# Patient Record
Sex: FEMALE | Race: ASIAN | NOT HISPANIC OR LATINO | URBAN - METROPOLITAN AREA
[De-identification: names, ages, dates, MRNs, and addresses within clinical notes are randomized per-mention and may not be internally consistent; named-entity substitution may affect disease eponyms.]

---

## 2022-07-20 ENCOUNTER — EMERGENCY (EMERGENCY)
Facility: HOSPITAL | Age: 57
LOS: 1 days | Discharge: HOME | End: 2022-07-20
Attending: EMERGENCY MEDICINE | Admitting: EMERGENCY MEDICINE

## 2022-07-20 VITALS
HEART RATE: 73 BPM | RESPIRATION RATE: 16 BRPM | TEMPERATURE: 98 F | SYSTOLIC BLOOD PRESSURE: 101 MMHG | DIASTOLIC BLOOD PRESSURE: 63 MMHG | OXYGEN SATURATION: 99 %

## 2022-07-20 VITALS
HEART RATE: 75 BPM | DIASTOLIC BLOOD PRESSURE: 67 MMHG | RESPIRATION RATE: 17 BRPM | SYSTOLIC BLOOD PRESSURE: 101 MMHG | TEMPERATURE: 98 F | OXYGEN SATURATION: 99 %

## 2022-07-20 PROCEDURE — 99283 EMERGENCY DEPT VISIT LOW MDM: CPT

## 2022-07-20 RX ORDER — IBUPROFEN 200 MG
1 TABLET ORAL
Qty: 21 | Refills: 0
Start: 2022-07-20 | End: 2022-07-26

## 2022-07-20 NOTE — ED PROVIDER NOTE - PROGRESS NOTE DETAILS
Patient no longer wants to wait for CT scan after finding out that it will most likely be a couple of hours before results. Patient would like to leave. DO Abi (PGY-2)

## 2022-07-20 NOTE — ED PROVIDER NOTE - NSFOLLOWUPINSTRUCTIONS_ED_ALL_ED_FT
Please follow up with your primary care physician within the next 1-3 days.    Please return to the emergency department if you experience any of the following symptoms:    Fever  Chest pain  Difficulty breathing  Abdominal pain  Nausea  Vomiting   Weakness in arms or legs  Dizziness    Fall Prevention    WHAT YOU NEED TO KNOW:    Fall prevention includes ways to make your home and other areas safer. It also includes ways you can move more carefully to prevent a fall. Health conditions that cause changes in your blood pressure, vision, or muscle strength and coordination may increase your risk for falls. Medicines may also increase your risk for falls if they make you dizzy, weak, or sleepy.    DISCHARGE INSTRUCTIONS:    Call 911 or have someone else call if:  You have fallen and are unconscious.  You have fallen and cannot move part of your body.  Contact your healthcare provider if:  You have fallen and have pain or a headache.  You have questions or concerns about your condition or care.  Fall prevention tips:  Stand or sit up slowly. This may help you keep your balance and prevent falls.  Use assistive devices as directed. Your healthcare provider may suggest that you use a cane or walker to help you keep your balance. You may need to have grab bars put in your bathroom near the toilet or in the shower.  Wear shoes that fit well and have soles that . Wear shoes both inside and outside. Use slippers with good . Do not wear shoes with high heels.  Wear a personal alarm. This is a device that allows you to call 911 if you fall and need help. Ask your healthcare provider for more information.  Stay active. Exercise can help strengthen your muscles and improve your balance. Your healthcare provider may recommend water aerobics or walking. He or she may also recommend physical therapy to improve your coordination. Never start an exercise program without talking to your healthcare provider first.  Walking for Exercise  Manage your medical conditions. Keep all appointments with your healthcare providers. Visit your eye doctor as directed.  Home safety tips:  Fall Prevention for Adults  Add items to prevent falls in the bathroom. Put nonslip strips on your bath or shower floor to prevent you from slipping. Use a bath mat if you do not have carpet in the bathroom. This will prevent you from falling when you step out of the bath or shower. Use a shower seat so you do not need to stand while you shower. Sit on the toilet or a chair in your bathroom to dry yourself and put on clothing. This will prevent you from losing your balance from drying or dressing yourself while you are standing.  Keep paths clear. Remove books, shoes, and other objects from walkways and stairs. Place cords for telephones and lamps out of the way so that you do not need to walk over them. Tape them down if you cannot move them. Remove small rugs. If you cannot remove a rug, secure it with double-sided tape. This will prevent you from tripping.  Install bright lights in your home. Use night lights to help light paths to the bathroom or kitchen. Always turn on the light before you start walking.  Keep items you use often on shelves within reach. Do not use a step stool to help you reach an item.  Paint or place reflective tape on the edges of your stairs. This will help you see the stairs better.  Follow up with your healthcare provider as directed: Write down your questions so you remember to ask them during your visits.

## 2022-07-20 NOTE — ED PROVIDER NOTE - OBJECTIVE STATEMENT
57 year old female with no PMHx presenting after a fall. Patient fell two days ago in the parking lot after tripping over a bump. She fell, hit the right side of her face on the pavement. Denies LOC, AC use, ASA use, FND, nausea, vomiting, difficulty breathing. Patient complaining of swelling and bruising around her right eye. Patient also has some pain at the right upper 5th digit and right knee, patient has no difficulty ambulating or pain with movement of the right hand.

## 2022-07-20 NOTE — ED ADULT NURSE NOTE - NS ED NURSE DISCH DISPOSITION
Tried to call pt about No show appointment on  7/27/21. Phone was not working so was not able to leave a message. Discharged

## 2022-07-20 NOTE — ED PROVIDER NOTE - CLINICAL SUMMARY MEDICAL DECISION MAKING FREE TEXT BOX
57 year old female with no PMHx presenting after a fall with right sided facial pain and swelling without FND. CT head, CT maxillofacial.

## 2022-07-20 NOTE — ED PROVIDER NOTE - PHYSICAL EXAMINATION
gen: well appearing  Mentation: AAO x 3  psych: mood appropriate  HEENT: airway patent, conjunctivae clear bilaterally  Neuro: sensation and motor function grossly intact, 5/5 muscle strength bilaterally in both UEX and BENEDICT  Skin: No evidence of rash  MSK: normal movement of all extremities, right knee ACL, PCL, LCL, MCL intact without swelling  Lymph/Vasc: no LE edema

## 2022-07-20 NOTE — ED PROVIDER NOTE - PATIENT PORTAL LINK FT
You can access the FollowMyHealth Patient Portal offered by [facility] by registering at the following website: [email or web address]. By joining [employer] FollowMyHealth portal, you will also be able to view your health information using other applications (apps) compatible with our system.

## 2022-07-20 NOTE — ED ADULT TRIAGE NOTE - CHIEF COMPLAINT QUOTE
Pt s/p mechanical fall 3 days ago with right head/face strike c/o swelling/brusing to right sided brow, forehead.  Pt denies any further symptoms

## 2022-07-20 NOTE — ED PROVIDER NOTE - ATTENDING CONTRIBUTION TO CARE
DR. AQUINO, ATTENDING MD-  I performed a face to face bedside interview with the patient regarding history of present illness, review of symptoms and past medical history. I completed an independent physical exam.  I have discussed the patient's plan of care with the resident.   Documentation as above in the note.    56 [facility].  No loc, vision changes, neck pain, numbness tingling weakness.  She has swelling over r maxillary prominence and around r eye.  Perrla, eomi, no crepitus, midface stable.  No drainage ears and nose.  Will obtain ct h max face give pain med likely dc with pcp f/u.

## 2024-07-13 NOTE — ED PROVIDER NOTE - TOBACCO USE
Never smoker Bed/Stretcher in lowest position, wheels locked, appropriate side rails in place/Call bell, personal items and telephone in reach/Instruct patient to call for assistance before getting out of bed/chair/stretcher/Non-slip footwear applied when patient is off stretcher/[place] to call system/Physically safe environment - no spills, clutter or unnecessary equipment/Purposeful proactive rounding/Room/bathroom lighting operational, light cord in reach